# Patient Record
Sex: FEMALE | Race: WHITE | URBAN - METROPOLITAN AREA
[De-identification: names, ages, dates, MRNs, and addresses within clinical notes are randomized per-mention and may not be internally consistent; named-entity substitution may affect disease eponyms.]

---

## 2019-01-21 ENCOUNTER — HISTORICAL (OUTPATIENT)
Dept: ADMINISTRATIVE | Facility: HOSPITAL | Age: 54
End: 2019-01-21

## 2019-01-21 LAB
ABS NEUT (OLG): 4.33 X10(3)/MCL (ref 2.1–9.2)
ALBUMIN SERPL-MCNC: 3.6 GM/DL (ref 3.4–5)
ALBUMIN/GLOB SERPL: 0.95 RATIO (ref 1.1–2)
ALP SERPL-CCNC: 85 UNIT/L (ref 45–117)
ALT SERPL-CCNC: 18 UNIT/L (ref 12–78)
AST SERPL-CCNC: 17 UNIT/L (ref 15–37)
BASOPHILS # BLD AUTO: 0.05 X10(3)/MCL
BASOPHILS NFR BLD AUTO: 1 %
BILIRUB SERPL-MCNC: 0.2 MG/DL (ref 0.2–1)
BILIRUBIN DIRECT+TOT PNL SERPL-MCNC: <0.1 MG/DL
BILIRUBIN DIRECT+TOT PNL SERPL-MCNC: ABNORMAL MG/DL
BUN SERPL-MCNC: 16 MG/DL (ref 7–18)
CALCIUM SERPL-MCNC: 9.4 MG/DL (ref 8.5–10.1)
CHLORIDE SERPL-SCNC: 103 MMOL/L (ref 98–107)
CO2 SERPL-SCNC: 32 MMOL/L (ref 21–32)
CREAT SERPL-MCNC: 0.7 MG/DL (ref 0.6–1.3)
EOSINOPHIL # BLD AUTO: 0.23 10*3/UL
EOSINOPHIL NFR BLD AUTO: 3 %
ERYTHROCYTE [DISTWIDTH] IN BLOOD BY AUTOMATED COUNT: 12.7 % (ref 11.5–14.5)
GLOBULIN SER-MCNC: 3.8 GM/ML (ref 2.3–3.5)
GLUCOSE SERPL-MCNC: 106 MG/DL (ref 74–106)
HCT VFR BLD AUTO: 43.8 % (ref 35–46)
HGB BLD-MCNC: 14.4 GM/DL (ref 12–16)
IMM GRANULOCYTES # BLD AUTO: 0.02 10*3/UL
IMM GRANULOCYTES NFR BLD AUTO: 0 %
LYMPHOCYTES # BLD AUTO: 1.86 X10(3)/MCL
LYMPHOCYTES NFR BLD AUTO: 27 % (ref 13–40)
MCH RBC QN AUTO: 28.9 PG (ref 26–34)
MCHC RBC AUTO-ENTMCNC: 32.9 GM/DL (ref 31–37)
MCV RBC AUTO: 87.8 FL (ref 80–100)
MONOCYTES # BLD AUTO: 0.37 X10(3)/MCL
MONOCYTES NFR BLD AUTO: 5 % (ref 4–12)
NEUTROPHILS # BLD AUTO: 4.33 X10(3)/MCL
NEUTROPHILS NFR BLD AUTO: 63 X10(3)/MCL
PLATELET # BLD AUTO: 214 X10(3)/MCL (ref 130–400)
PMV BLD AUTO: 11.2 FL (ref 7.4–10.4)
POTASSIUM SERPL-SCNC: 4.8 MMOL/L (ref 3.5–5.1)
PROT SERPL-MCNC: 7.4 GM/DL (ref 6.4–8.2)
RBC # BLD AUTO: 4.99 X10(6)/MCL (ref 4–5.2)
SODIUM SERPL-SCNC: 139 MMOL/L (ref 136–145)
WBC # SPEC AUTO: 6.9 X10(3)/MCL (ref 4.5–11)

## 2019-09-26 ENCOUNTER — HISTORICAL (OUTPATIENT)
Dept: ADMINISTRATIVE | Facility: HOSPITAL | Age: 54
End: 2019-09-26

## 2019-09-26 LAB
ABS NEUT (OLG): 4.18 X10(3)/MCL (ref 2.1–9.2)
ALBUMIN SERPL-MCNC: 3.8 GM/DL (ref 3.4–5)
ALBUMIN/GLOB SERPL: 0.9 RATIO (ref 1.1–2)
ALP SERPL-CCNC: 107 UNIT/L (ref 45–117)
ALT SERPL-CCNC: 24 UNIT/L (ref 12–78)
APPEARANCE, UA: CLEAR
AST SERPL-CCNC: 16 UNIT/L (ref 15–37)
BACTERIA #/AREA URNS AUTO: ABNORMAL /[HPF]
BASOPHILS # BLD AUTO: 0 X10(3)/MCL (ref 0–0.2)
BASOPHILS NFR BLD AUTO: 0 %
BILIRUB SERPL-MCNC: 0.4 MG/DL (ref 0.2–1)
BILIRUB UR QL STRIP: NEGATIVE
BILIRUBIN DIRECT+TOT PNL SERPL-MCNC: 0.1 MG/DL (ref 0–0.2)
BILIRUBIN DIRECT+TOT PNL SERPL-MCNC: 0.3 MG/DL
BUN SERPL-MCNC: 15 MG/DL (ref 7–18)
CALCIUM SERPL-MCNC: 9.2 MG/DL (ref 8.5–10.1)
CHLORIDE SERPL-SCNC: 103 MMOL/L (ref 98–107)
CO2 SERPL-SCNC: 32 MMOL/L (ref 21–32)
COLOR UR: YELLOW
CREAT SERPL-MCNC: 0.6 MG/DL (ref 0.6–1.3)
CREAT UR-MCNC: 106 MG/DL
CRP SERPL-MCNC: 0.7 MG/DL
EOSINOPHIL # BLD AUTO: 0.2 X10(3)/MCL (ref 0–0.9)
EOSINOPHIL NFR BLD AUTO: 3 %
ERYTHROCYTE [DISTWIDTH] IN BLOOD BY AUTOMATED COUNT: 12.7 % (ref 11.5–14.5)
ERYTHROCYTE [SEDIMENTATION RATE] IN BLOOD: 13 MM/HR (ref 0–20)
GLOBULIN SER-MCNC: 4.1 GM/ML (ref 2.3–3.5)
GLUCOSE (UA): NORMAL
GLUCOSE SERPL-MCNC: 95 MG/DL (ref 74–106)
HBV CORE AB SERPL QL IA: NONREACTIVE
HBV CORE IGM SERPL QL IA: NONREACTIVE
HBV SURFACE AG SERPL QL IA: NEGATIVE
HCT VFR BLD AUTO: 46.1 % (ref 35–46)
HGB BLD-MCNC: 15 GM/DL (ref 12–16)
HGB UR QL STRIP: 0.06 MG/DL
HYALINE CASTS #/AREA URNS LPF: ABNORMAL /[LPF]
IMM GRANULOCYTES # BLD AUTO: 0.02 10*3/UL
IMM GRANULOCYTES NFR BLD AUTO: 0 %
KETONES UR QL STRIP: NEGATIVE
LEUKOCYTE ESTERASE UR QL STRIP: NEGATIVE
LYMPHOCYTES # BLD AUTO: 1.7 X10(3)/MCL (ref 0.6–4.6)
LYMPHOCYTES NFR BLD AUTO: 26 %
MAGNESIUM SERPL-MCNC: 2.3 MG/DL (ref 1.6–2.6)
MCH RBC QN AUTO: 29.2 PG (ref 26–34)
MCHC RBC AUTO-ENTMCNC: 32.5 GM/DL (ref 31–37)
MCV RBC AUTO: 89.9 FL (ref 80–100)
MONOCYTES # BLD AUTO: 0.4 X10(3)/MCL (ref 0.1–1.3)
MONOCYTES NFR BLD AUTO: 6 %
NEUTROPHILS # BLD AUTO: 4.18 X10(3)/MCL (ref 2.1–9.2)
NEUTROPHILS NFR BLD AUTO: 64 %
NITRITE UR QL STRIP: NEGATIVE
PH UR STRIP: 5.5 [PH] (ref 4.5–8)
PLATELET # BLD AUTO: 219 X10(3)/MCL (ref 130–400)
PMV BLD AUTO: 10.4 FL (ref 7.4–10.4)
POTASSIUM SERPL-SCNC: 4 MMOL/L (ref 3.5–5.1)
PROT SERPL-MCNC: 7.9 GM/DL (ref 6.4–8.2)
PROT UR QL STRIP: NEGATIVE
PROT UR STRIP-MCNC: 9.4 MG/DL
PROT/CREAT UR-RTO: 88.7 MG/GM
RBC # BLD AUTO: 5.13 X10(6)/MCL (ref 4–5.2)
RBC #/AREA URNS AUTO: ABNORMAL /[HPF]
SERINE PROT 3-ARUP: 0 AU/ML
SODIUM SERPL-SCNC: 138 MMOL/L (ref 136–145)
SP GR UR STRIP: 1.02 (ref 1–1.03)
SQUAMOUS #/AREA URNS LPF: ABNORMAL /[LPF]
UROBILINOGEN UR STRIP-ACNC: NORMAL
WBC # SPEC AUTO: 6.6 X10(3)/MCL (ref 4.5–11)
WBC #/AREA URNS AUTO: ABNORMAL /HPF

## 2020-09-17 ENCOUNTER — HISTORICAL (OUTPATIENT)
Dept: ADMINISTRATIVE | Facility: HOSPITAL | Age: 55
End: 2020-09-17

## 2020-09-17 LAB
ABS NEUT (OLG): 4.06 X10(3)/MCL (ref 2.1–9.2)
ALBUMIN SERPL-MCNC: 3.7 GM/DL (ref 3.4–5)
ALBUMIN/GLOB SERPL: 1.1 RATIO (ref 1.1–2)
ALP SERPL-CCNC: 85 UNIT/L (ref 45–117)
ALT SERPL-CCNC: 15 UNIT/L (ref 12–78)
AST SERPL-CCNC: 8 UNIT/L (ref 15–37)
BASOPHILS # BLD AUTO: 0 X10(3)/MCL (ref 0–0.2)
BASOPHILS NFR BLD AUTO: 1 %
BILIRUB SERPL-MCNC: 0.3 MG/DL (ref 0.2–1)
BILIRUBIN DIRECT+TOT PNL SERPL-MCNC: 0.1 MG/DL (ref 0–0.2)
BILIRUBIN DIRECT+TOT PNL SERPL-MCNC: 0.2 MG/DL
BUN SERPL-MCNC: 12 MG/DL (ref 7–18)
CALCIUM SERPL-MCNC: 9 MG/DL (ref 8.5–10.1)
CHLORIDE SERPL-SCNC: 105 MMOL/L (ref 98–107)
CHOLEST SERPL-MCNC: 272 MG/DL
CHOLEST/HDLC SERPL: 4.5 {RATIO} (ref 0–4.4)
CO2 SERPL-SCNC: 28 MMOL/L (ref 21–32)
CREAT SERPL-MCNC: 0.6 MG/DL (ref 0.6–1.3)
CRP SERPL-MCNC: <0.3 MG/DL
DEPRECATED CALCIDIOL+CALCIFEROL SERPL-MC: 34.7 NG/ML (ref 30–80)
EOSINOPHIL # BLD AUTO: 0.3 X10(3)/MCL (ref 0–0.9)
EOSINOPHIL NFR BLD AUTO: 4 %
ERYTHROCYTE [DISTWIDTH] IN BLOOD BY AUTOMATED COUNT: 13 % (ref 11.5–14.5)
ERYTHROCYTE [SEDIMENTATION RATE] IN BLOOD: 9 MM/HR (ref 0–20)
GLOBULIN SER-MCNC: 3.5 GM/ML (ref 2.3–3.5)
GLUCOSE SERPL-MCNC: 104 MG/DL (ref 74–106)
HCT VFR BLD AUTO: 44.8 % (ref 35–46)
HCV AB SERPL QL IA: NONREACTIVE
HDLC SERPL-MCNC: 61 MG/DL (ref 40–59)
HGB BLD-MCNC: 14.7 GM/DL (ref 12–16)
IMM GRANULOCYTES # BLD AUTO: 0.02 10*3/UL
IMM GRANULOCYTES NFR BLD AUTO: 0 %
LDLC SERPL CALC-MCNC: 184 MG/DL
LYMPHOCYTES # BLD AUTO: 2 X10(3)/MCL (ref 0.6–4.6)
LYMPHOCYTES NFR BLD AUTO: 30 %
MCH RBC QN AUTO: 29.2 PG (ref 26–34)
MCHC RBC AUTO-ENTMCNC: 32.8 GM/DL (ref 31–37)
MCV RBC AUTO: 89.1 FL (ref 80–100)
MONOCYTES # BLD AUTO: 0.4 X10(3)/MCL (ref 0.1–1.3)
MONOCYTES NFR BLD AUTO: 5 %
NEG CONT SPOT COUNT: NORMAL
NEUTROPHILS # BLD AUTO: 4.06 X10(3)/MCL (ref 2.1–9.2)
NEUTROPHILS NFR BLD AUTO: 60 %
PANEL A SPOT COUNT: 1
PANEL B SPOT COUNT: 0
PLATELET # BLD AUTO: 232 X10(3)/MCL (ref 130–400)
PMV BLD AUTO: 10.5 FL (ref 7.4–10.4)
POS CONT SPOT COUNT: NORMAL
POTASSIUM SERPL-SCNC: 4.2 MMOL/L (ref 3.5–5.1)
PROT SERPL-MCNC: 7.2 GM/DL (ref 6.4–8.2)
RBC # BLD AUTO: 5.03 X10(6)/MCL (ref 4–5.2)
SODIUM SERPL-SCNC: 139 MMOL/L (ref 136–145)
T-SPOT.TB: NORMAL
TRIGL SERPL-MCNC: 134 MG/DL
VLDLC SERPL CALC-MCNC: 27 MG/DL
WBC # SPEC AUTO: 6.8 X10(3)/MCL (ref 4.5–11)

## 2020-12-07 ENCOUNTER — HISTORICAL (OUTPATIENT)
Dept: ADMINISTRATIVE | Facility: HOSPITAL | Age: 55
End: 2020-12-07

## 2020-12-07 LAB
ABS NEUT (OLG): 3.32 X10(3)/MCL (ref 2.1–9.2)
ALBUMIN SERPL-MCNC: 3.8 GM/DL (ref 3.5–5)
ALBUMIN/GLOB SERPL: 1.3 RATIO (ref 1.1–2)
ALP SERPL-CCNC: 73 UNIT/L (ref 40–150)
ALT SERPL-CCNC: 10 UNIT/L (ref 0–55)
AST SERPL-CCNC: 12 UNIT/L (ref 5–34)
BASOPHILS # BLD AUTO: 0.1 X10(3)/MCL (ref 0–0.2)
BASOPHILS NFR BLD AUTO: 1 %
BILIRUB SERPL-MCNC: 0.4 MG/DL
BILIRUBIN DIRECT+TOT PNL SERPL-MCNC: 0.1 MG/DL (ref 0–0.5)
BILIRUBIN DIRECT+TOT PNL SERPL-MCNC: 0.3 MG/DL (ref 0–0.8)
BUN SERPL-MCNC: 15 MG/DL (ref 9.8–20.1)
CALCIUM SERPL-MCNC: 9.2 MG/DL (ref 8.4–10.2)
CHLORIDE SERPL-SCNC: 103 MMOL/L (ref 98–107)
CO2 SERPL-SCNC: 26 MMOL/L (ref 22–29)
CREAT SERPL-MCNC: 0.68 MG/DL (ref 0.55–1.02)
CRP SERPL-MCNC: 0.2 MG/DL
EOSINOPHIL # BLD AUTO: 0.2 X10(3)/MCL (ref 0–0.9)
EOSINOPHIL NFR BLD AUTO: 4 %
ERYTHROCYTE [DISTWIDTH] IN BLOOD BY AUTOMATED COUNT: 12.7 % (ref 11.5–14.5)
ERYTHROCYTE [SEDIMENTATION RATE] IN BLOOD: 8 MM/HR (ref 0–20)
GLOBULIN SER-MCNC: 3 GM/DL (ref 2.4–3.5)
GLUCOSE SERPL-MCNC: 124 MG/DL (ref 74–100)
HCT VFR BLD AUTO: 41.6 % (ref 35–46)
HGB BLD-MCNC: 13.5 GM/DL (ref 12–16)
IMM GRANULOCYTES # BLD AUTO: 0.02 10*3/UL
IMM GRANULOCYTES NFR BLD AUTO: 0 %
LYMPHOCYTES # BLD AUTO: 1.8 X10(3)/MCL (ref 0.6–4.6)
LYMPHOCYTES NFR BLD AUTO: 31 %
MCH RBC QN AUTO: 29.4 PG (ref 26–34)
MCHC RBC AUTO-ENTMCNC: 32.5 GM/DL (ref 31–37)
MCV RBC AUTO: 90.6 FL (ref 80–100)
MONOCYTES # BLD AUTO: 0.3 X10(3)/MCL (ref 0.1–1.3)
MONOCYTES NFR BLD AUTO: 6 %
NEUTROPHILS # BLD AUTO: 3.32 X10(3)/MCL (ref 2.1–9.2)
NEUTROPHILS NFR BLD AUTO: 58 %
PLATELET # BLD AUTO: 237 X10(3)/MCL (ref 130–400)
PMV BLD AUTO: 11 FL (ref 7.4–10.4)
POTASSIUM SERPL-SCNC: 4.2 MMOL/L (ref 3.5–5.1)
PROT SERPL-MCNC: 6.8 GM/DL (ref 6.4–8.3)
RBC # BLD AUTO: 4.59 X10(6)/MCL (ref 4–5.2)
SODIUM SERPL-SCNC: 137 MMOL/L (ref 136–145)
WBC # SPEC AUTO: 5.7 X10(3)/MCL (ref 4.5–11)

## 2021-08-30 ENCOUNTER — HISTORICAL (OUTPATIENT)
Dept: ADMINISTRATIVE | Facility: HOSPITAL | Age: 56
End: 2021-08-30

## 2021-08-30 LAB
BILIRUB SERPL-MCNC: NEGATIVE MG/DL
BLOOD URINE, POC: NORMAL
CLARITY, POC UA: CLEAR
COLOR, POC UA: YELLOW
GLUCOSE UR QL STRIP: NEGATIVE
KETONES UR QL STRIP: NEGATIVE
LEUKOCYTE EST, POC UA: NEGATIVE
NITRITE, POC UA: NEGATIVE
PH, POC UA: 6
PROTEIN, POC: NEGATIVE
SARS-COV-2 RNA RESP QL NAA+PROBE: NEGATIVE
SPECIFIC GRAVITY, POC UA: 1.01
UROBILINOGEN, POC UA: NORMAL

## 2021-09-01 LAB — FINAL CULTURE: NO GROWTH

## 2022-09-13 NOTE — HISTORICAL [facility] CERNER
This is a historical note converted from Ivette. Formatting and pictures may have been removed.  Please reference Ivette for original formatting and attached multimedia. Chief Complaint  established  History of Present Illness  ???? Ms.?Lisandra?is a 53-year-old female psoriasis, likely psoriatic arthritis, L5-S1 radiculopathy in early fibromyalgia. She has a reported history of lupus diagnosed in 2001 by rheumatologist in [place]. After moving here she saw Dr. Medrano from 2014 until 2018 who did not think she had SLE, but did start Plaquenil for possible inflammatory arthritis. Workup here was negative for SLE. 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 She was diagnosed with psoriasis in ,?well controlled now. Mother also has psoriasis. At her first visit in February, she saw ANN Hernández who got baseline labs was concern for symptoms?were more due to fibromyalgia than SLE. In July 2018?she f/u with?recent diagnosis of herniated disc in her back-had MRI ordered by?Chiro.?L5-S1?touch nerve root. Also had experienced a fall 1 month prior?and had right and left leg pain.?Started PT.  ?  ???? Today she presents reporting pain all over. Fingers, thumbs, wrists, feet are reportedly the worst. Has back pain related to a herniated disc, currently undergoing PT regularly but is unsure how much it is helping due to arthritic pain. Dropping things and loosing  strength. Active psoriasis now in ears and intertriginous regions--breasts and groin. Reporting episodes of dizziness that began months ago, occurring about 2-3 times per day, lasting anywhere from a few minutes to all day?per episode. Episodes wax and wane in intensity of dizziness. Has a history of white matter lesions in the spine from imaging done in [place]. Will refer for neuro eval. Not dizzy at this time.  ?  PCP- Joe Casey-also sees Lane?Meaghan at a walk-in clinic?near her  workplace-?sees them more than her PCP.?  Cardiologist:?Dr. Shirley.  ?  Review of Systems  General: Denies chills and fever  Ophthalmologic: Denies discharge. Denies flashes of light in visual field  ENT: Denies decreased sense of smell. Denies nosebleeds.?  Endocrine: Denies excessive sweating. Denies weight loss  Respiratory: Denies hemoptysis. Denies tuberculosis.  Cardiovascular: Denies cyanosis. Denies rheumatic fever.  Gastrointestinal: Denies difficulty swallowing. Denies hematemesis.  Hematology: Denies bleeding problems. Denies recent transfusion.  Peripheral vascular: Denies absent pulses in feet. Denies ulceration of the feet  Skin: Denies blistering of skin. Denies skin cancer.  Neurologic denies seizures. Denies stroke.  Psychiatric: Denies eating disorder. Denies loss of appetite.?  Physical Exam  Vitals & Measurements  T:?37.3? ?C (Oral)? HR:?76(Peripheral)? RR:?16? BP:?111/72?  HT:?157.48?cm? HT:?157.48?cm? WT:?77.5?kg? WT:?77.5?kg? BMI:?31.25?  General examination: Alert, pleasant, in no acute distress.  Head: Normocephalic, atraumatic.  Eyes: Conjunctiva clear, upper eyelids normal, lower eyelids normal.  Oral cavity: Mucosa moist, no lesions, fair dentition.  Throat: No exudate, no erythema.  Neck/thyroid: Neck supple, no lymphadenopathy, no thyroid nodules  Skin: No suspicious lesions, warm and dry.  Heart: S1, S2 normal, regular rate and rhythm. No JVD.  Lungs: Clear to auscultation, no accessory muscle use.  Musculoskeletal: +synovitis left first mcp, warm and tender. FROM.?No deformities.  Extremities: No clubbing or cyanosis.  Neurologic: Alert and oriented, cranial nerves II through XII grossly intact. Muscle strength grossly intact. Sensation grossly intact.  Psych: Cognitive function intact, mood/affect full range.?  ?  February 2018  JEROME neg  SSA/SSB negative  RNP negative  SM negative  RF negative  CCP negative  ESR 24, WNL  CRP 0.6  CK 95, WNL  C3/C4 WNL  CBC WNL, hemoglobin?14.6  CMP  WNL  Hep B/C negative  UDS negative  UA WNL  Urine protein WNL  ~3 pages received and reviewed from urgent care note?PMH lupus, if pain and A. fib, no current medications, no labs.  ???? Brodie note received to review, this from [id number]. Labs 2014 JEROME:?neg  ???? Dr. Medrano?reviewed records from rheumatologist in [place]-patient and C4 that was minimally decreased but JEROME was negative. Previous anticardiolipin antibodies and lupus anticoagulant were negative.?In 2014 long discussion was given to the patient?not resuming Plaquenil.?Follow-up was scheduled to receive six-month. Then in May 2015 at her follow-up she developed symptoms of inflammatory arthritis?and was placed on Plaquenil and low-dose steroids because this worked in the past and dramatically improved her symptoms.  Assessment/Plan  1.?Psoriatic Arthritis--Active synovitis and psoriatic skin lesions. Will order for Otezla?to  from outpatient pharmacy here at [facility]. Doubling length of time?titrating dose?to extend to 28 day interval. Routine labs and xrays today.  2. Ataxia sometimes occurring all day.?Ill-defined history of White Matter lesions--Refer to [facility].  3. Reported history of lupus, which cannot be substantiated. She reports a diagnosis of lupus in 2001 by?rheum in [place]. She moved here 2014 and started seeing Dr. Medrano who reviewed previous rheum records-not?dx w/SLE?(negative JEROME).?Dr. Medrano repeated labs and JEROME again negative in 2014.?At her first?visit in?2/18, we repeated JEROME and ENAs, which were all negative. Likewise, we do not see any evidence of?SLE, as it would be highly unusual to have?SLE with a negative JEROME (we will need biopsy of?an affected organ to otherwise make the diagnosis). Off Plaquenil, as can worsen psoriasis.  4. Fibromyalgia. Exacerbated by stress of taking care of elderly mother and son, as well as working. Very poor sleep. Discussed sleep-will try Flexeril daily at bedtime and discuss getting  elderly mother metastases so she will sleep?(perhaps a geriatrician).  5. L5 - S1 radiculopathy. Follow up with Dr. Arteaga as scheduled. Continue PT. Using Advil 800 mg twice a day.  6. Smoking. Cessation encouraged, she is not ready to quit yet.  7. OA knees with CMC joints. Try topical?Pennsaid and?meloxicam as needed.   Problem List/Past Medical History  Ongoing  Lupus  Obesity  Psoriatic arthritis  Tobacco user  Historical  Kidney stone  Procedure/Surgical History  DNA C AND LAPAROSCOPY X 2  ABLATATION  CERVICAL CONE BIOPSY  LITHOTRYPSY X 2  TONSILLECTOMY   Medications  Clotrimazole-Betamethasone Crm  FLUARIX QUAD 2018-19 INJ 0.5ML,? ?Not Taking, Completed Rx  Otezla 30 mg oral tablet, See Instructions, 5 refills  Otezla Starter Pack oral tablet, See Instructions  Prednisone 20 Mg Tablet, 20 mg= 1 tab(s), Oral, BID,? ?Not Taking, Completed Rx  SUBOXONE 8 MG-2 MG SL FILM  Allergies  Bactrim?(hives)  Biaxin?(hives)  Darvocet A500?(hives)  Demerol HCl?(hives)  Flagyl?(hives)  cephalosporins?(anaphylactic)  morphine?(hives)  penicillins?(anaphylactic)  Social History  Alcohol  Past, 01/21/2019  Employment/School  Employed, Work/School description: ., 01/21/2019  Exercise  Exercise type: Walking., 01/21/2019  Home/Environment  Lives with Spouse. Home equipment: b/p monitor, nebulizer.., 01/21/2019  Nutrition/Health  Regular, 01/21/2019  Substance Abuse  Never, 01/21/2019  Tobacco  10 or more cigarettes (1/2 pack or more)/day in last 30 days, No, Ready to change: No. Previous treatment: None., 01/21/2019  Family History  Primary malignant neoplasm of bone: Father.  Primary malignant neoplasm of breast: Mother.  Primary malignant neoplasm of lung: Father.  Health Maintenance  Health Maintenance  ???Pending?(in the next year)  ??? ??OverDue  ??? ? ? ?Diabetes Screening due??and every?  ??? ? ? ?Cervical Cancer Screening due??05/13/17??and every 3??year(s)  ??? ??Due?  ??? ? ? ?ADL Screening due??01/21/19??and  every 1??year(s)  ??? ? ? ?Alcohol Misuse Screening due??01/21/19??and every 1??year(s)  ??? ? ? ?Breast Cancer Screening due??01/21/19??and every?  ??? ? ? ?Colorectal Screening due??01/21/19??and every?  ??? ? ? ?Lipid Screening due??01/21/19??and every?  ??? ? ? ?Smoking Cessation due??01/21/19??Variable frequency  ??? ? ? ?Tetanus Vaccine due??01/21/19??and every 10??year(s)  ???Satisfied?(in the past 1 year)  ??? ??Satisfied?  ??? ? ? ?Blood Pressure Screening on??01/21/19.??Satisfied by Yamilet Lyman LPN  ??? ? ? ?Body Mass Index Check on??01/21/19.??Satisfied by Yamilet Lyman LPN  ??? ? ? ?Depression Screening on??01/21/19.??Satisfied by Yamilet Lyman LPN  ??? ? ? ?Diabetes Screening on??02/19/18.??Satisfied by Jaci Montilla  ??? ? ? ?Influenza Vaccine on??01/21/19.  ??? ? ? ?Obesity Screening on??01/21/19.??Satisfied by Yamilet Lyman LPN  ?  ?